# Patient Record
(demographics unavailable — no encounter records)

---

## 2024-10-14 NOTE — HISTORY OF PRESENT ILLNESS
[de-identified] : 9/10/2024. [de-identified] : 4 weeks 6 days open reduction and internal fixation, greater than 3-part intraarticular left distal radius fracture using Arthrex volar locking plate as well as left open carpal tunnel decompression. OT 2-3x/week with improvements. He states he has noticed stiffness in the mornings and no strength in his hand [de-identified] : Patient now has excellent digital range of motion.  Full extension full flexion.  Digital extensors and EPL intact.  Incisions healing nicely.  Full pronation full supination wrist extension 60 wrist flexion 60 [de-identified] : PA lateral oblique x-rays demonstrate status post ORIF in good alignment left wrist [de-identified] : 4 weeks 6 days open reduction and internal fixation, greater than 3-part intraarticular left distal radius fracture using Arthrex volar locking plate as well as left open carpal tunnel decompression. [de-identified] : At this point recommend that he begin strength training.  I like to see back in 6 weeks

## 2024-10-14 NOTE — HISTORY OF PRESENT ILLNESS
[de-identified] : 9/10/2024. [de-identified] : 4 weeks 6 days open reduction and internal fixation, greater than 3-part intraarticular left distal radius fracture using Arthrex volar locking plate as well as left open carpal tunnel decompression. OT 2-3x/week with improvements. He states he has noticed stiffness in the mornings and no strength in his hand [de-identified] : Patient now has excellent digital range of motion.  Full extension full flexion.  Digital extensors and EPL intact.  Incisions healing nicely.  Full pronation full supination wrist extension 60 wrist flexion 60 [de-identified] : PA lateral oblique x-rays demonstrate status post ORIF in good alignment left wrist [de-identified] : 4 weeks 6 days open reduction and internal fixation, greater than 3-part intraarticular left distal radius fracture using Arthrex volar locking plate as well as left open carpal tunnel decompression. [de-identified] : At this point recommend that he begin strength training.  I like to see back in 6 weeks

## 2024-12-02 NOTE — HISTORY OF PRESENT ILLNESS
[de-identified] : DOS: 9/10/2024. [de-identified] :  11 weeks 6 days open reduction and internal fixation, greater than 3-part intraarticular left distal radius fracture using Arthrex volar locking plate as well as left open carpal tunnel decompression Patient has been going for OT twice a week. [de-identified] : Positive lift maneuver left middle ring and small fingers with triggering.  Excellent wrist range of motion.  Good digital range of motion.  No signs of CRPS incision healing nicely [de-identified] : PA lateral oblique x-rays demonstrate status post ORIF left distal radius fracture [de-identified] :  11 weeks 6 days open reduction and internal fixation, greater than 3-part intraarticular left distal radius fracture using Arthrex volar locking plate as well as left open carpal tunnel decompression [de-identified] : Patient has developed postop trigger fingers middle ring and small fingers.  This was emailed to me via his therapist.  Recommended trigger finger injections  My impression is that this patient has left middle finger stenosing tenosynovitis of the finger, more commonly known as a trigger digit.  I recommended that the patient undergo a trigger finger injection. The risks, benefits, and alternatives were discussed with the patient in detail. This included (but was not limited to) pain, infection, subcutaneous atrophy, skin depigmentation, elevated glucose, etc...  The patient agreed to undergo the steroid injection. Under informed consent and sterile conditions, 1/2 cc of 2% plain lidocaine  and 1/2 cc of Kenalog 10 was precisely injected into the patient's finger.   A sterile Band-Aid was placed.  It is my hope that this significantly alleviates the patient's symptoms.   My impression is that this patient has left ring finger stenosing tenosynovitis of the finger, more commonly known as a trigger digit.  I recommended that the patient undergo a trigger finger injection. The risks, benefits, and alternatives were discussed with the patient in detail. This included (but was not limited to) pain, infection, subcutaneous atrophy, skin depigmentation, elevated glucose, etc...  The patient agreed to undergo the steroid injection. Under informed consent and sterile conditions, 1/2 cc of 2% plain lidocaine  and 1/2 cc of Kenalog 10 was precisely injected into the patient's finger.   A sterile Band-Aid was placed.  It is my hope that this significantly alleviates the patient's symptoms.  My impression is that this patient has left small finger stenosing tenosynovitis of the finger, more commonly known as a trigger digit.  I recommended that the patient undergo a trigger finger injection. The risks, benefits, and alternatives were discussed with the patient in detail. This included (but was not limited to) pain, infection, subcutaneous atrophy, skin depigmentation, elevated glucose, etc...  The patient agreed to undergo the steroid injection. Under informed consent and sterile conditions, 1/2 cc of 2% plain lidocaine  and 1/2 cc of Kenalog 10 was precisely injected into the patient's finger.   A sterile Band-Aid was placed.  It is my hope that this significantly alleviates the patient's symptoms.   Will renew his therapy prescription and I will see him back in 2 months for final visit

## 2024-12-02 NOTE — HISTORY OF PRESENT ILLNESS
[de-identified] : DOS: 9/10/2024. [de-identified] :  11 weeks 6 days open reduction and internal fixation, greater than 3-part intraarticular left distal radius fracture using Arthrex volar locking plate as well as left open carpal tunnel decompression Patient has been going for OT twice a week. [de-identified] : Positive lift maneuver left middle ring and small fingers with triggering.  Excellent wrist range of motion.  Good digital range of motion.  No signs of CRPS incision healing nicely [de-identified] : PA lateral oblique x-rays demonstrate status post ORIF left distal radius fracture [de-identified] :  11 weeks 6 days open reduction and internal fixation, greater than 3-part intraarticular left distal radius fracture using Arthrex volar locking plate as well as left open carpal tunnel decompression [de-identified] : Patient has developed postop trigger fingers middle ring and small fingers.  This was emailed to me via his therapist.  Recommended trigger finger injections  My impression is that this patient has left middle finger stenosing tenosynovitis of the finger, more commonly known as a trigger digit.  I recommended that the patient undergo a trigger finger injection. The risks, benefits, and alternatives were discussed with the patient in detail. This included (but was not limited to) pain, infection, subcutaneous atrophy, skin depigmentation, elevated glucose, etc...  The patient agreed to undergo the steroid injection. Under informed consent and sterile conditions, 1/2 cc of 2% plain lidocaine  and 1/2 cc of Kenalog 10 was precisely injected into the patient's finger.   A sterile Band-Aid was placed.  It is my hope that this significantly alleviates the patient's symptoms.   My impression is that this patient has left ring finger stenosing tenosynovitis of the finger, more commonly known as a trigger digit.  I recommended that the patient undergo a trigger finger injection. The risks, benefits, and alternatives were discussed with the patient in detail. This included (but was not limited to) pain, infection, subcutaneous atrophy, skin depigmentation, elevated glucose, etc...  The patient agreed to undergo the steroid injection. Under informed consent and sterile conditions, 1/2 cc of 2% plain lidocaine  and 1/2 cc of Kenalog 10 was precisely injected into the patient's finger.   A sterile Band-Aid was placed.  It is my hope that this significantly alleviates the patient's symptoms.  My impression is that this patient has left small finger stenosing tenosynovitis of the finger, more commonly known as a trigger digit.  I recommended that the patient undergo a trigger finger injection. The risks, benefits, and alternatives were discussed with the patient in detail. This included (but was not limited to) pain, infection, subcutaneous atrophy, skin depigmentation, elevated glucose, etc...  The patient agreed to undergo the steroid injection. Under informed consent and sterile conditions, 1/2 cc of 2% plain lidocaine  and 1/2 cc of Kenalog 10 was precisely injected into the patient's finger.   A sterile Band-Aid was placed.  It is my hope that this significantly alleviates the patient's symptoms.   Will renew his therapy prescription and I will see him back in 2 months for final visit

## 2025-02-04 NOTE — HISTORY OF PRESENT ILLNESS
[FreeTextEntry1] : DOS: 9/10/2024  Pt is approx 4 months s/p ORIF, greater than 3 part intraarticular left distal radius fracture using Arthrex volar locking plate with left open carpal tunnel decompression. Pt was last seen in office on 12/2/24 in which patient received a cortisone injection for the left ring, middle, and small finger triggers after developing these issues postop.

## 2025-03-24 NOTE — PHYSICAL EXAM
[de-identified] : Incision well-healed.  He can demonstrate some clicking at the radial aspect of the CMC joint with circumduction of the thumb no pain resisted thumb or index finger flexion.  Nontender at basal joint although there is a positive grind test.  There is still a trigger digits of the left middle and ring finger but not particularly tender [de-identified] : PA lateral oblique x-ray left wrist demonstrate a healed fracture with the hardware in good position.  Ultrasound does not demonstrate any FPL tenosynovitis or FDP tenosynovitis.  There is no proud hardware dorsally.  No tenosynovitis of first second third or fourth compartment.  There is what appears to be an effusion from the thumb CMC joint both radially and dorsally

## 2025-03-24 NOTE — PHYSICAL EXAM
[de-identified] : Incision well-healed.  He can demonstrate some clicking at the radial aspect of the CMC joint with circumduction of the thumb no pain resisted thumb or index finger flexion.  Nontender at basal joint although there is a positive grind test.  There is still a trigger digits of the left middle and ring finger but not particularly tender [de-identified] : PA lateral oblique x-ray left wrist demonstrate a healed fracture with the hardware in good position.  Ultrasound does not demonstrate any FPL tenosynovitis or FDP tenosynovitis.  There is no proud hardware dorsally.  No tenosynovitis of first second third or fourth compartment.  There is what appears to be an effusion from the thumb CMC joint both radially and dorsally

## 2025-03-24 NOTE — ASSESSMENT
[FreeTextEntry1] : Patient status post ORIF left distal wrist fracture and has 2 new trigger digits that are minimally symptomatic after an injection in December so we will observe these.  Having some snapping around the CMC joint and some crepitus which is likely related to the synovitis in the CMC joint and APL snapping over it.  Does not appear to be coming from FPL or the hardware.  Recommended diagnostic and therapeutic CMC joint injection   We discussed treatment options and she elected to undergo a left  basal joint injection. The risks, benefits, and alternatives were discussed with the patient. This included, but was not limited to nerve injury, infection, subcutaneous atrophy, skin depigmentation etc. Under informed consent and sterile conditions the basal joint was injected with a combination of 1/2 cc Kenalog-10 and 1/2 cc of 2% plain lidocaine. It is my hopes that this significantly alleviates the patients symptoms.  Will observe the trigger digits.  If a worsen or recur and cause symptoms could consider trigger finger decompression with hardware removal.  Hopefully CMC joint injection resolves this issue which is not painful

## 2025-03-24 NOTE — HISTORY OF PRESENT ILLNESS
Patient without past medical history presents with right hand pain after hitting his hand against an object he believes to be the wall while he was vacuuming today.  Incident occurred 1 hour prior to arrival.  Denies focal weakness or paresthesias.  Last tetanus unknown. [Left] : left hand dominant [FreeTextEntry1] : 6 months s/p open reduction and internal fixation, greater than 3-part intraarticular left distal radius fracture using Arthrex volar locking plate as well as left open carpal tunnel decompression. DOS: 9/10/2024. He states that his range of motion in the left wrist is good just need more strengthening.  Patient received a left middle and ring and small trigger finger injection on December 2, 2024. He reports that he has been having stiffness in the left ring finger mostly in the morning.  Patient reports weakness in the left .  As well as clicking with certain thumb motions

## 2025-03-24 NOTE — HISTORY OF PRESENT ILLNESS
[Left] : left hand dominant [FreeTextEntry1] : 6 months s/p open reduction and internal fixation, greater than 3-part intraarticular left distal radius fracture using Arthrex volar locking plate as well as left open carpal tunnel decompression. DOS: 9/10/2024. He states that his range of motion in the left wrist is good just need more strengthening.  Patient received a left middle and ring and small trigger finger injection on December 2, 2024. He reports that he has been having stiffness in the left ring finger mostly in the morning.  Patient reports weakness in the left .  As well as clicking with certain thumb motions

## 2025-06-13 NOTE — HISTORY OF PRESENT ILLNESS
[Left] : left hand dominant [FreeTextEntry1] : 9 months s/p open reduction and internal fixation, greater than 3-part intraarticular left distal radius fracture using Arthrex volar locking plate as well as left open carpal tunnel decompression. DOS: 9/10/2024.  Patient received a left basal joint injection on March 24, 2025 Patient received a left middle and ring and small trigger finger injection on December 2, 2024.